# Patient Record
Sex: FEMALE | Race: OTHER | NOT HISPANIC OR LATINO | URBAN - METROPOLITAN AREA
[De-identification: names, ages, dates, MRNs, and addresses within clinical notes are randomized per-mention and may not be internally consistent; named-entity substitution may affect disease eponyms.]

---

## 2022-06-09 ENCOUNTER — EMERGENCY (EMERGENCY)
Facility: HOSPITAL | Age: 22
LOS: 1 days | Discharge: HOME | End: 2022-06-09
Attending: STUDENT IN AN ORGANIZED HEALTH CARE EDUCATION/TRAINING PROGRAM
Payer: SELF-PAY

## 2022-06-09 VITALS
HEIGHT: 62 IN | TEMPERATURE: 99 F | OXYGEN SATURATION: 98 % | DIASTOLIC BLOOD PRESSURE: 75 MMHG | RESPIRATION RATE: 18 BRPM | WEIGHT: 199.96 LBS | SYSTOLIC BLOOD PRESSURE: 112 MMHG | HEART RATE: 90 BPM

## 2022-06-09 VITALS
RESPIRATION RATE: 16 BRPM | TEMPERATURE: 99 F | HEART RATE: 85 BPM | OXYGEN SATURATION: 100 % | DIASTOLIC BLOOD PRESSURE: 81 MMHG | SYSTOLIC BLOOD PRESSURE: 122 MMHG

## 2022-06-09 LAB
APPEARANCE UR: CLEAR — SIGNIFICANT CHANGE UP
BACTERIA # UR AUTO: NEGATIVE — SIGNIFICANT CHANGE UP
BILIRUB UR-MCNC: NEGATIVE — SIGNIFICANT CHANGE UP
COLOR SPEC: SIGNIFICANT CHANGE UP
DIFF PNL FLD: NEGATIVE — SIGNIFICANT CHANGE UP
EPI CELLS # UR: 1 /HPF — SIGNIFICANT CHANGE UP
GLUCOSE UR QL: NEGATIVE — SIGNIFICANT CHANGE UP
HYALINE CASTS # UR AUTO: 1 /LPF — SIGNIFICANT CHANGE UP (ref 0–2)
KETONES UR-MCNC: NEGATIVE — SIGNIFICANT CHANGE UP
LEUKOCYTE ESTERASE UR-ACNC: ABNORMAL
NITRITE UR-MCNC: NEGATIVE — SIGNIFICANT CHANGE UP
PH UR: 6 — SIGNIFICANT CHANGE UP (ref 5–8)
PROT UR-MCNC: NEGATIVE — SIGNIFICANT CHANGE UP
RBC CASTS # UR COMP ASSIST: 1 /HPF — SIGNIFICANT CHANGE UP (ref 0–4)
SP GR SPEC: 1 — LOW (ref 1.01–1.02)
UROBILINOGEN FLD QL: NEGATIVE — SIGNIFICANT CHANGE UP
WBC UR QL: 48 /HPF — HIGH (ref 0–5)

## 2022-06-09 PROCEDURE — 99283 EMERGENCY DEPT VISIT LOW MDM: CPT

## 2022-06-09 PROCEDURE — 81001 URINALYSIS AUTO W/SCOPE: CPT

## 2022-06-09 PROCEDURE — 87086 URINE CULTURE/COLONY COUNT: CPT

## 2022-06-09 PROCEDURE — 87186 SC STD MICRODIL/AGAR DIL: CPT

## 2022-06-09 PROCEDURE — 99284 EMERGENCY DEPT VISIT MOD MDM: CPT

## 2022-06-09 RX ORDER — NITROFURANTOIN MACROCRYSTAL 50 MG
1 CAPSULE ORAL
Qty: 10 | Refills: 0
Start: 2022-06-09 | End: 2022-06-13

## 2022-06-09 RX ORDER — NITROFURANTOIN MACROCRYSTAL 50 MG
1 CAPSULE ORAL
Qty: 10 | Refills: 0
Start: 2022-06-09 | End: 2022-06-14

## 2022-06-09 RX ORDER — NITROFURANTOIN MACROCRYSTAL 50 MG
100 CAPSULE ORAL ONCE
Refills: 0 | Status: COMPLETED | OUTPATIENT
Start: 2022-06-09 | End: 2022-06-09

## 2022-06-09 RX ADMIN — Medication 100 MILLIGRAM(S): at 23:16

## 2022-06-09 NOTE — ED PROVIDER NOTE - OBJECTIVE STATEMENT
22y F pmhx asthma presents to ED accompanied by mother c/o constant, dull, occasionally sharp suprapubic pain radiating through lower abdominal pain w/ increased urinary frequency x 3 days. No dysuria, hematuria, f/c, back pain, nvd, cp, sob. Not sexually active within last year. No hx of abdominal surgeries.

## 2022-06-09 NOTE — ED PROVIDER NOTE - NSFOLLOWUPINSTRUCTIONS_ED_ALL_ED_FT
Follow-up with primary care in 2-3 days.    Read all discharge instructions    Take Macrobid (nitrofurantoin) as directed.    Continue to take all other medications as directed.    Return to the emergency room immediately if your symptoms worsen or persist, or if you have fever (100.4'F), shaking chills, headache, back/side pain, difficulty urinating, vomiting and unable to keep food or water down, or if any other concerning or questionable symptoms.

## 2022-06-09 NOTE — ED PROVIDER NOTE - PHYSICAL EXAMINATION
GEN: Pt well appearing, non-toxic in NAD, A&Ox3.  PSYCH: Affect and mood appropriate.  EYES: Sclera white w/o injection, EOMI.  ENT: MMM. Neck supple FROM. Airway patent.  RESP: CTA b/l, no wheezes, rales, or rhonchi.   CARDIAC: RRR, clear distinct S1, S2, no appreciable murmurs.  ABD: Abdomen soft, mild suprapubic ttp, no rebound or guarding. No CVAT b/l.  MSK: Moving all extremities.  NEURO: No focal motor or sensory deficits.  VASC: Radial pulses 2+ b/l. No edema or calf tenderness.  SKIN: No rashes or lesions.

## 2022-06-09 NOTE — ED PROVIDER NOTE - CLINICAL SUMMARY MEDICAL DECISION MAKING FREE TEXT BOX
PB Jules, Attending: seen with GIANLUCA and helped create clinical plan.    22 yoF, no major PMHx, presenting with 3 days of urinary sx. Denies f/c, shawn pain, NVD. No surg hx. Does not think she is pregnant. No lateralizing pain.    Looks well. Normal vitals. No distress. No marked suprapubic ttp on exam. No active vomiting.    Suspect cystitis. Plan to screen for prep and check ua/ucx. At this time no concern for pyelo, ovarian issue, or acute intraabd surgical issue

## 2022-06-09 NOTE — ED PROVIDER NOTE - PROGRESS NOTE DETAILS
Sterling Joseph PA-C: UA w/ 48 wbc, large Leuks. Macrobid given in ED and sent to confirmed pt pharmacy. Results DC instructions and return precautions reviewed.

## 2022-06-09 NOTE — ED PROVIDER NOTE - NS ED ATTENDING STATEMENT MOD
This was a shared visit with the GIANLUCA. I reviewed and verified the documentation and independently performed the documented:

## 2022-06-09 NOTE — ED ADULT NURSE NOTE - OBJECTIVE STATEMENT
Pt is 23 y/o female, AxOx3, presenting to the ED c/o abdominal pain x3 days. Pt state abdominal pain is worse when standing. Reports last bowel movement was today. Pt endorses chills and urinary frequency. PMH of asthma and compliant w/ daily asthma medication. Upon assessment, breathing spontaneously and unlabored. Abdomen is soft and tender to LLQ. Ambulates w/o difficultly and moves all extremities w/ = strength. Skin is warm, dry, and intact w/ + peripheral pulses. Pt denies SOB, chest pain, n/v/d, and dizziness. Safety and comfort measures provided- bed in lowest position, locked, and blanket given.

## 2022-06-12 NOTE — ED POST DISCHARGE NOTE - DETAILS
6/13: Culture sensitive to macrobid. As per chart review no concern for pyelo. No further intervention needed. Pamela Schumacher PA-C

## 2024-03-25 NOTE — ED PROVIDER NOTE - CHILD ABUSE FACILITY
[facility] X Size Of Lesion In Cm (Optional): 0 Detail Level: Detailed Scheduling Instructions (Optional): AMANDA Introduction Text (Please End With A Colon): The following procedure was deferred: benign cosmetic destruction Procedure To Be Performed At Next Visit: Electrodesiccation (Cosmetic)